# Patient Record
Sex: MALE | Race: WHITE | URBAN - METROPOLITAN AREA
[De-identification: names, ages, dates, MRNs, and addresses within clinical notes are randomized per-mention and may not be internally consistent; named-entity substitution may affect disease eponyms.]

---

## 2018-03-09 ENCOUNTER — HOSPITAL ENCOUNTER (OUTPATIENT)
Dept: LAB | Age: 53
Discharge: HOME | End: 2018-03-09

## 2018-03-09 PROCEDURE — 88305 TISSUE EXAM BY PATHOLOGIST: CPT | Performed by: INTERNAL MEDICINE

## 2022-10-10 ENCOUNTER — HOSPITAL ENCOUNTER (EMERGENCY)
Dept: ULTRASOUND IMAGING | Age: 57
Discharge: HOME | End: 2022-10-13

## 2022-10-10 ENCOUNTER — HOSPITAL ENCOUNTER (EMERGENCY)
Age: 57
Discharge: HOME | End: 2022-10-10
Attending: STUDENT IN AN ORGANIZED HEALTH CARE EDUCATION/TRAINING PROGRAM

## 2022-10-10 VITALS
HEART RATE: 113 BPM | RESPIRATION RATE: 16 BRPM | SYSTOLIC BLOOD PRESSURE: 129 MMHG | BODY MASS INDEX: 28.88 KG/M2 | TEMPERATURE: 97.9 F | WEIGHT: 225 LBS | HEIGHT: 74 IN | OXYGEN SATURATION: 100 % | DIASTOLIC BLOOD PRESSURE: 68 MMHG

## 2022-10-10 DIAGNOSIS — N45.3 EPIDIDYMOORCHITIS: Primary | ICD-10-CM

## 2022-10-10 LAB
BACTERIA URNS QL MICRO: 0 /HPF
CASTS URNS QL MICRO: NORMAL /LPF
CRYSTALS URNS QL MICRO: NORMAL /LPF
EPI CELLS #/AREA URNS HPF: NORMAL /HPF
MUCOUS THREADS URNS QL MICRO: 0 /LPF
RBC #/AREA URNS HPF: NORMAL /HPF
WBC URNS QL MICRO: NORMAL /HPF

## 2022-10-10 PROCEDURE — 81015 MICROSCOPIC EXAM OF URINE: CPT

## 2022-10-10 PROCEDURE — 6360000002 HC RX W HCPCS: Performed by: PHYSICIAN ASSISTANT

## 2022-10-10 PROCEDURE — 87086 URINE CULTURE/COLONY COUNT: CPT

## 2022-10-10 PROCEDURE — 76870 US EXAM SCROTUM: CPT

## 2022-10-10 PROCEDURE — 2500000003 HC RX 250 WO HCPCS: Performed by: PHYSICIAN ASSISTANT

## 2022-10-10 PROCEDURE — 99284 EMERGENCY DEPT VISIT MOD MDM: CPT

## 2022-10-10 PROCEDURE — 87491 CHLMYD TRACH DNA AMP PROBE: CPT

## 2022-10-10 PROCEDURE — 96372 THER/PROPH/DIAG INJ SC/IM: CPT

## 2022-10-10 RX ORDER — OMEPRAZOLE 10 MG/1
10 CAPSULE, DELAYED RELEASE ORAL DAILY
COMMUNITY

## 2022-10-10 RX ORDER — ATORVASTATIN CALCIUM 40 MG/1
40 TABLET, FILM COATED ORAL EVERY MORNING
COMMUNITY

## 2022-10-10 RX ORDER — FOLIC ACID 1 MG/1
1 TABLET ORAL DAILY
COMMUNITY

## 2022-10-10 RX ORDER — DOXYCYCLINE HYCLATE 100 MG
100 TABLET ORAL 2 TIMES DAILY
Qty: 20 TABLET | Refills: 0 | Status: SHIPPED | OUTPATIENT
Start: 2022-10-10 | End: 2022-10-20

## 2022-10-10 RX ADMIN — CEFTRIAXONE SODIUM 500 MG: 500 INJECTION, POWDER, FOR SOLUTION INTRAMUSCULAR; INTRAVENOUS at 17:57

## 2022-10-10 ASSESSMENT — ENCOUNTER SYMPTOMS
NAUSEA: 0
RESPIRATORY NEGATIVE: 1
DIARRHEA: 0
VOMITING: 0
ALLERGIC/IMMUNOLOGIC NEGATIVE: 1
EYES NEGATIVE: 1
SORE THROAT: 0
COUGH: 0
SHORTNESS OF BREATH: 0
GASTROINTESTINAL NEGATIVE: 1
ABDOMINAL PAIN: 0
CONSTIPATION: 0

## 2022-10-10 ASSESSMENT — PAIN DESCRIPTION - DESCRIPTORS: DESCRIPTORS: DULL

## 2022-10-10 ASSESSMENT — PAIN - FUNCTIONAL ASSESSMENT: PAIN_FUNCTIONAL_ASSESSMENT: 0-10

## 2022-10-10 ASSESSMENT — PAIN DESCRIPTION - LOCATION: LOCATION: GROIN

## 2022-10-10 ASSESSMENT — PAIN SCALES - GENERAL: PAINLEVEL_OUTOF10: 2

## 2022-10-10 NOTE — ED PROVIDER NOTES
Emergency Department Provider Note                   PCP:                None Provider               Age: 49 y.o. Sex: male       ICD-10-CM    1. Epididymoorchitis  N45.3           DISPOSITION Decision To Discharge 10/10/2022 05:26:24 PM        MDM     Amount and/or Complexity of Data Reviewed  Clinical lab tests: reviewed  Tests in the radiology section of CPT®: ordered and reviewed    Risk of Complications, Morbidity, and/or Mortality  Presenting problems: moderate  Diagnostic procedures: moderate  Management options: moderate  General comments: 5:31 PM Sunny served Dr. Susana Nieves regarding patient. Patient's ultrasound indicating bilateral epididymal orchitis. Rocephin IM given here. Urine sent for culture along with GC. Patient referred to urology for follow-up. I have written for doxycycline twice daily at home. Patient was encouraged to drink plenty of fluids, rest, note written for work for 2 days and return to the ED if worsening in any way. Patient is stable for discharge and ambulatory out of the ED without difficulty at this time. Patient Progress  Patient progress: stable             Orders Placed This Encounter   Procedures    Culture, Urine    C.trachomatis N.gonorrhoeae DNA    US SCROTUM AND TESTICLES    Urinalysis, Micro    POCT Urine Dipstick        Medications   cefTRIAXone (ROCEPHIN) 500 mg in lidocaine 1 % 1.4286 mL IM Injection (500 mg IntraMUSCular Given 10/10/22 1757)       Discharge Medication List as of 10/10/2022  6:02 PM        START taking these medications    Details   doxycycline hyclate (VIBRA-TABS) 100 MG tablet Take 1 tablet by mouth 2 times daily for 10 days, Disp-20 tablet, R-0Print              Levis Aase is a 49 y.o. male who presents to the Emergency Department with chief complaint of    Chief Complaint   Patient presents with    Testicle Swelling      Patient is here with bilateral testicular swelling/pain that started on Thursday, 4 days ago.   No dysuria, polyuria, penile discharge, abdominal or back pain, trouble with bowel movements, chest pain, shortness of breath, fever or other new symptoms. Patient states he has had infection in his testicles before and feels like that is what he has now. He did ambulate to the room without difficulty and is well-hydrated. The history is provided by the patient. Testicle Pain   This is a new problem. The current episode started 3 to 5 days ago. The onset was gradual. The problem occurs continuously. The problem has been unchanged. The problem affects both sides. There is No reported injury. There is swelling in both testicles. There was no injury mechanism. The pain is mild. The symptoms are relieved by rest. Nothing aggravates the symptoms. Associated symptoms include testicular pain. Pertinent negatives include no chest pain, no anorexia, no chills, no fever, no abdominal pain, no constipation, no diarrhea, no nausea, no vomiting, no discolored urine, no dysuria, no frequency, no genital lesions, no hematuria, no hesitancy, no painful intercourse, no penile discharge, no penile pain, no urgency, no urinary burning, no urinary retention, no headaches, no sore throat, no flank pain, no joint pain, no joint swelling, no cough, no shortness of breath and no rash. He is experiencing no difficulties urinating. He has received no recent medical care. Review of Systems   Constitutional: Negative. Negative for chills and fever. HENT: Negative. Negative for sore throat. Eyes: Negative. Respiratory: Negative. Negative for cough and shortness of breath. Cardiovascular: Negative. Negative for chest pain. Gastrointestinal: Negative. Negative for abdominal pain, anorexia, constipation, diarrhea, nausea and vomiting. Endocrine: Negative. Genitourinary:  Positive for scrotal swelling and testicular pain.  Negative for dysuria, flank pain, frequency, hematuria, hesitancy, penile discharge, penile pain and urgency. Musculoskeletal: Negative. Negative for joint pain. Skin: Negative. Negative for rash. Allergic/Immunologic: Negative. Neurological: Negative. Negative for headaches. Hematological: Negative. Psychiatric/Behavioral: Negative. All other systems reviewed and are negative. No past medical history on file. No past surgical history on file. No family history on file. Social History     Socioeconomic History    Marital status:          Patient has no known allergies. Discharge Medication List as of 10/10/2022  6:02 PM        CONTINUE these medications which have NOT CHANGED    Details   omeprazole (PRILOSEC) 10 MG delayed release capsule Take 10 mg by mouth dailyHistorical Med      atorvastatin (LIPITOR) 40 MG tablet Take 40 mg by mouth every morningHistorical Med      methotrexate (RHEUMATREX) 2.5 MG chemo tablet Take 2.5 mg by mouth once a weekHistorical Med      folic acid (FOLVITE) 1 MG tablet Take 1 mg by mouth dailyHistorical Med              Vitals signs and nursing note reviewed. No data found. Physical Exam  Vitals and nursing note reviewed. Exam conducted with a chaperone present Fernanda Paez RN). Constitutional:       Appearance: He is well-developed and normal weight. HENT:      Head: Normocephalic and atraumatic. Nose: Nose normal.      Mouth/Throat:      Mouth: Mucous membranes are moist.   Eyes:      Extraocular Movements: Extraocular movements intact. Cardiovascular:      Rate and Rhythm: Normal rate. Pulses: Normal pulses. Pulmonary:      Effort: Pulmonary effort is normal.   Abdominal:      General: Abdomen is flat. Palpations: Abdomen is soft. Genitourinary:     Testes:         Right: Tenderness and swelling present. Left: Tenderness and swelling present. Musculoskeletal:         General: Normal range of motion. Skin:     General: Skin is warm.       Capillary Refill: Capillary refill takes less than 2 seconds. Neurological:      General: No focal deficit present. Mental Status: He is alert and oriented to person, place, and time. Psychiatric:         Mood and Affect: Mood normal.         Behavior: Behavior normal.        Procedures    Results for orders placed or performed during the hospital encounter of 10/10/22    [address]    Narrative    Exam: Scrotal ultrasound. Indication: Bilateral testicular swelling and pain for 6 days. Comparison: None. FINDINGS:   Right testicle: Normal size and echotexture measuring 4.1 x 3.2 x 2.8 cm. No  focal mass. Hypervascularity of the testicle evident. .    Right peritesticular tissues: The epididymis is normal in appearance with  hypervascularity. Normal scrotal skin thickness. No varicocele. Moderately sized  hydrocele. Left testicle: Normal size and echotexture measuring 4.2 x 2.4 x 2.2 cm with  hypervascularity. No focal mass. Normal spectral and color Doppler flow. Left peritesticular tissues: The epididymis is normal in echogenicity with  hypervascularity. Normal scrotal skin thickness. Small epididymal head appendage  incidentally noted measuring 0.8 x 0.7 x 0.6 cm No varicocele. Large hydrocele  with some internal debris. .        Impression    1. Hypervascularity of the testicles and epididymis bilaterally indicative of  bilateral epididymoorchitis. 2. Moderate right and large left hydroceles, likely reactive. Urinalysis, Micro   Result Value Ref Range    WBC, UA 20-50 0 /hpf    RBC, UA 0-3 0 /hpf    Epithelial Cells UA 0-3 0 /hpf    BACTERIA, URINE 0 0 /hpf    Casts 3-5 0 /lpf    Crystals MODERATE 0 /LPF    Mucus, UA 0 0 /lpf        US SCROTUM AND TESTICLES   Final Result   1. Hypervascularity of the testicles and epididymis bilaterally indicative of   bilateral epididymoorchitis. 2. Moderate right and large left hydroceles, likely reactive.                                    Voice dictation software was used during the making of this note. This software is not perfect and grammatical and other typographical errors may be present. This note has not been completely proofread for errors.      JASON Olsen  10/10/22 [id number]

## 2022-10-10 NOTE — ED NOTES
I have reviewed discharge instructions with the patient. The patient verbalized understanding. Patient left ED via Discharge Method: ambulatory to Home with (self). Opportunity for questions and clarification provided. Patient given 1 scripts. No esign         To continue your aftercare when you leave the hospital, you may receive an automated call from our care team to check in on how you are doing. This is a free service and part of our promise to provide the best care and service to meet your aftercare needs.  If you have questions, or wish to unsubscribe from this service please call [phone]. Thank you for Choosing our [address] [place] [address] Emergency Department.       Sebas Lozano RN  10/10/22 3301

## 2022-10-10 NOTE — Clinical Note
Martha Mabry was seen and treated in our emergency department on 10/10/2022. He may return to work on 10/12/2022. If you have any questions or concerns, please don't hesitate to call.       JASON Paul

## 2022-10-10 NOTE — DISCHARGE INSTRUCTIONS
Drink plenty of fluids, rest, finish all of the antibiotics, follow-up with urology for recheck and return to the ED if worsening in any way.

## 2022-10-10 NOTE — ED TRIAGE NOTES
Pt c/o bilateral testicle swelling and pain x 5-6 days. Pt states this has happened before. Pt states he is homeless.

## 2022-10-11 ENCOUNTER — TELEPHONE (OUTPATIENT)
Dept: UROLOGY | Age: 57
End: 2022-10-11

## 2022-10-11 NOTE — TELEPHONE ENCOUNTER
Pt went to ER with bilateral epididymitis.    Sent home on abx, culture sent   Needs follow up in 1-2 weeks, can seen NP.

## 2022-10-13 LAB
BACTERIA SPEC CULT: NORMAL
SERVICE CMNT-IMP: NORMAL

## 2022-10-15 LAB
C TRACH RRNA SPEC QL NAA+PROBE: NEGATIVE
N GONORRHOEA RRNA SPEC QL NAA+PROBE: NEGATIVE
SPECIMEN SOURCE: NORMAL

## 2024-01-12 ENCOUNTER — HOSPITAL ENCOUNTER (EMERGENCY)
Age: 59
Discharge: HOME | End: 2024-01-12